# Patient Record
Sex: FEMALE | URBAN - METROPOLITAN AREA
[De-identification: names, ages, dates, MRNs, and addresses within clinical notes are randomized per-mention and may not be internally consistent; named-entity substitution may affect disease eponyms.]

---

## 2020-06-23 ENCOUNTER — NURSE TRIAGE (OUTPATIENT)
Dept: OTHER | Facility: OTHER | Age: 40
End: 2020-06-23

## 2020-06-23 DIAGNOSIS — Z20.828 EXPOSURE TO SARS-ASSOCIATED CORONAVIRUS: Primary | ICD-10-CM

## 2020-06-23 DIAGNOSIS — Z20.828 EXPOSURE TO SARS-ASSOCIATED CORONAVIRUS: ICD-10-CM

## 2020-06-23 PROCEDURE — U0003 INFECTIOUS AGENT DETECTION BY NUCLEIC ACID (DNA OR RNA); SEVERE ACUTE RESPIRATORY SYNDROME CORONAVIRUS 2 (SARS-COV-2) (CORONAVIRUS DISEASE [COVID-19]), AMPLIFIED PROBE TECHNIQUE, MAKING USE OF HIGH THROUGHPUT TECHNOLOGIES AS DESCRIBED BY CMS-2020-01-R: HCPCS

## 2020-06-24 LAB — SARS-COV-2 RNA SPEC QL NAA+PROBE: NOT DETECTED

## 2020-06-26 ENCOUNTER — TELEPHONE (OUTPATIENT)
Dept: OTHER | Facility: OTHER | Age: 40
End: 2020-06-26

## 2021-04-13 ENCOUNTER — TELEPHONE (OUTPATIENT)
Dept: RADIOLOGY | Facility: HOSPITAL | Age: 41
End: 2021-04-13

## 2023-06-20 ENCOUNTER — APPOINTMENT (EMERGENCY)
Dept: RADIOLOGY | Facility: HOSPITAL | Age: 43
End: 2023-06-20
Payer: COMMERCIAL

## 2023-06-20 ENCOUNTER — HOSPITAL ENCOUNTER (EMERGENCY)
Facility: HOSPITAL | Age: 43
Discharge: HOME | End: 2023-06-20
Attending: EMERGENCY MEDICINE
Payer: COMMERCIAL

## 2023-06-20 VITALS
OXYGEN SATURATION: 100 % | SYSTOLIC BLOOD PRESSURE: 137 MMHG | RESPIRATION RATE: 20 BRPM | DIASTOLIC BLOOD PRESSURE: 65 MMHG | HEART RATE: 98 BPM | TEMPERATURE: 98.1 F | WEIGHT: 152.8 LBS

## 2023-06-20 DIAGNOSIS — M79.601 PAIN OF RIGHT UPPER EXTREMITY: Primary | ICD-10-CM

## 2023-06-20 PROCEDURE — 73130 X-RAY EXAM OF HAND: CPT

## 2023-06-20 RX ORDER — NAPROXEN 500 MG/1
500 TABLET ORAL ONCE
Status: DISCONTINUED | OUTPATIENT
Start: 2023-06-20 | End: 2023-06-20

## 2023-06-20 RX ORDER — PREDNISONE 10 MG/1
TABLET ORAL
Qty: 30 TABLET | Refills: 0 | Status: SHIPPED | OUTPATIENT
Start: 2023-06-20

## 2023-06-20 RX ORDER — NAPROXEN 500 MG/1
500 TABLET ORAL 2 TIMES DAILY WITH MEALS
Qty: 20 TABLET | Refills: 0 | Status: SHIPPED | OUTPATIENT
Start: 2023-06-20 | End: 2023-06-20

## 2023-06-20 RX ADMIN — PREDNISONE 50 MG: 20 TABLET ORAL at 23:07

## 2023-06-21 ENCOUNTER — HOSPITAL ENCOUNTER (OUTPATIENT)
Dept: RADIOLOGY | Facility: HOSPITAL | Age: 43
Discharge: HOME | End: 2023-06-21
Attending: EMERGENCY MEDICINE
Payer: COMMERCIAL

## 2023-06-21 DIAGNOSIS — M79.601 PAIN OF RIGHT UPPER EXTREMITY: ICD-10-CM

## 2023-06-21 PROCEDURE — 93971 EXTREMITY STUDY: CPT

## 2023-06-21 NOTE — DISCHARGE INSTRUCTIONS
Xrays are ok  I'm not sure what is causing the pain, could be a pinched or irritated nerve or area of inflammation or fluid  Try the course of the anti-inflammatory as prescribed  Get the doppler ultrasound done tomorrow

## 2023-06-21 NOTE — ED PROVIDER NOTES
History  Chief Complaint   Patient presents with   • Arm Swelling     Got a tattoo on R arm  Has noticed ever since swelling in R wrist and pain to R elbow  Pt expresses concerns for blood clot  37 yo female c/o right hand pain, radiating up right arm x 3 weeks  Started after getting a tattoo on right upper arm  She is concerned she has a blood clot  She is using tylenol without relief  No fever, cough, rash or other associated symptoms  History provided by:  Patient   used: No        None       History reviewed  No pertinent past medical history  History reviewed  No pertinent surgical history  History reviewed  No pertinent family history  I have reviewed and agree with the history as documented  E-Cigarette/Vaping   • E-Cigarette Use Never User      E-Cigarette/Vaping Substances   • Nicotine No    • THC No    • CBD No    • Flavoring No    • Other No    • Unknown No      Social History     Tobacco Use   • Smoking status: Never   • Smokeless tobacco: Never   Vaping Use   • Vaping Use: Never used   Substance Use Topics   • Alcohol use: Yes     Comment: social   • Drug use: Not Currently       Review of Systems   Constitutional: Negative for fever  Respiratory: Negative for cough and shortness of breath  Cardiovascular: Negative for chest pain  Gastrointestinal: Negative for diarrhea and vomiting  Skin: Negative for rash  Physical Exam  Physical Exam  Vitals and nursing note reviewed  Constitutional:       General: She is not in acute distress  Appearance: She is not ill-appearing  HENT:      Head: Normocephalic and atraumatic  Cardiovascular:      Rate and Rhythm: Normal rate and regular rhythm  Heart sounds: Normal heart sounds  Pulmonary:      Effort: Pulmonary effort is normal  No respiratory distress  Breath sounds: Normal breath sounds  Musculoskeletal:         General: Tenderness present  No swelling or signs of injury   Normal range of motion  Cervical back: Normal range of motion and neck supple  Comments: No right arm swelling or discoloration  There is mild ttp right hand dorsum  Radial pulse palp  Tattoo site looks fine  Skin:     General: Skin is warm and dry  Findings: No erythema or rash  Neurological:      General: No focal deficit present  Mental Status: She is alert and oriented to person, place, and time  Psychiatric:         Mood and Affect: Mood normal          Behavior: Behavior normal          Vital Signs  ED Triage Vitals [06/20/23 2140]   Temperature Pulse Respirations Blood Pressure SpO2   98 1 °F (36 7 °C) 98 20 137/65 100 %      Temp Source Heart Rate Source Patient Position - Orthostatic VS BP Location FiO2 (%)   Tympanic Monitor Sitting Left arm --      Pain Score       No Pain           Vitals:    06/20/23 2140   BP: 137/65   Pulse: 98   Patient Position - Orthostatic VS: Sitting         Visual Acuity      ED Medications  Medications   predniSONE tablet 50 mg (50 mg Oral Given 6/20/23 2307)       Diagnostic Studies  Results Reviewed     None                 XR hand 3+ views RIGHT   Final Result by Tanika Ngo MD (06/21 1038)      No acute osseous abnormality  Workstation performed: [id number]                    Procedures  Procedures         ED Course                               SBIRT 20yo+    Flowsheet Row Most Recent Value   Initial Alcohol Screen: US AUDIT-C     1  How often do you have a drink containing alcohol? 2 Filed at: 06/20/2023 2222   2  How many drinks containing alcohol do you have on a typical day you are drinking? 1 Filed at: 06/20/2023 2222   3a  Male UNDER 65: How often do you have five or more drinks on one occasion? 0 Filed at: 06/20/2023 2222   3b  FEMALE Any Age, or MALE 65+: How often do you have 4 or more drinks on one occassion?  1 Filed at: 06/20/2023 2222   Audit-C Score 4 Filed at: 06/20/2023 2222   NIKI: How many times in the past year have you    Used an illegal drug or used a prescription medication for non-medical reasons? Never Filed at: 06/20/2023 2222                    Medical Decision Making  Xrays ok  Will do outpt  Doppler but I feel upper extremity DVT very unlikely  Advised course of naprosyn and follow up outpt  Ortho  Amount and/or Complexity of Data Reviewed  Radiology: ordered  Risk  Prescription drug management  Disposition  Final diagnoses:   Pain of right upper extremity     Time reflects when diagnosis was documented in both MDM as applicable and the Disposition within this note     Time User Action Codes Description Comment    9/99/5706 61:62 PM Aliza Landa Add [R18 427] Pain of right hand     7/57/7443 02:04 PM Aliza Landa Remove [F46 712] Pain of right hand     4/01/9940 16:67 PM Jose F ORELLANA Add [B98 352] Pain of right upper extremity       ED Disposition     ED Disposition   Discharge    Condition   Stable    Date/Time   Tue Jun 20, 2023 10:53 PM    Comment   Angelina Wetzeloitgedagt discharge to home/self care  Follow-up Information     Follow up With Specialties Details Why [address] , DO Orthopedic Surgery Schedule an appointment as soon as possible for a visit  As needed [address] [facility] [address]  [phone]            Discharge Medication List as of 6/20/2023 11:02 PM      START taking these medications    Details   predniSONE 10 mg tablet 5 po daily x2 days, then 4 po daily x2 days, then 3 po daily x2 days, then 2 po daily x2 days,then 1 po daily x2days, Normal             Outpatient Discharge Orders   VAS upper limb venous duplex scan, unilateral/limited   Standing Status: Future Number of Occurrences: 1 Standing Exp   Date: 06/20/27       PDMP Review     None          ED Provider  Electronically Signed by           Ange Morales MD  03/91/30 5109

## 2023-06-22 PROCEDURE — 93971 EXTREMITY STUDY: CPT | Performed by: SURGERY

## 2023-12-13 NOTE — RESULT ENCOUNTER NOTE
Patient called for notification of Negative COVID-19 test result  See Telephone Encounter for additional details  VICENTE spoke to Shirley with [employer] who stated to contact Sarah [phone] for update on acceptance.  VICENTE sent updated clinicals to [employer] via [employer] system for review and to submit for authorization if accepted.       12/13/23 1204   Post-Acute Status   Post-Acute Authorization Placement   Post-Acute Placement Status Pending post-acute provider review/more information requested